# Patient Record
Sex: FEMALE | Race: BLACK OR AFRICAN AMERICAN | NOT HISPANIC OR LATINO | Employment: UNEMPLOYED | URBAN - METROPOLITAN AREA
[De-identification: names, ages, dates, MRNs, and addresses within clinical notes are randomized per-mention and may not be internally consistent; named-entity substitution may affect disease eponyms.]

---

## 2023-11-10 ENCOUNTER — HOSPITAL ENCOUNTER (EMERGENCY)
Facility: HOSPITAL | Age: 38
Discharge: HOME | End: 2023-11-11
Attending: EMERGENCY MEDICINE
Payer: COMMERCIAL

## 2023-11-10 VITALS
RESPIRATION RATE: 16 BRPM | HEART RATE: 99 BPM | SYSTOLIC BLOOD PRESSURE: 128 MMHG | TEMPERATURE: 98.4 F | HEIGHT: 63 IN | DIASTOLIC BLOOD PRESSURE: 76 MMHG | WEIGHT: 120 LBS | OXYGEN SATURATION: 98 % | BODY MASS INDEX: 21.26 KG/M2

## 2023-11-10 DIAGNOSIS — R04.0 EPISTAXIS: Primary | ICD-10-CM

## 2023-11-10 PROCEDURE — 99282 EMERGENCY DEPT VISIT SF MDM: CPT

## 2023-11-10 PROCEDURE — 99283 EMERGENCY DEPT VISIT LOW MDM: CPT | Performed by: EMERGENCY MEDICINE

## 2023-11-10 PROCEDURE — 99285 EMERGENCY DEPT VISIT HI MDM: CPT | Performed by: EMERGENCY MEDICINE

## 2023-11-10 RX ORDER — OXYMETAZOLINE HCL 0.05 %
2 SPRAY, NON-AEROSOL (ML) NASAL EVERY 12 HOURS PRN
Status: DISCONTINUED | OUTPATIENT
Start: 2023-11-10 | End: 2023-11-11 | Stop reason: HOSPADM

## 2023-11-10 ASSESSMENT — COLUMBIA-SUICIDE SEVERITY RATING SCALE - C-SSRS
1. IN THE PAST MONTH, HAVE YOU WISHED YOU WERE DEAD OR WISHED YOU COULD GO TO SLEEP AND NOT WAKE UP?: NO
6. HAVE YOU EVER DONE ANYTHING, STARTED TO DO ANYTHING, OR PREPARED TO DO ANYTHING TO END YOUR LIFE?: NO
2. HAVE YOU ACTUALLY HAD ANY THOUGHTS OF KILLING YOURSELF?: NO

## 2023-11-11 PROCEDURE — 2500000001 HC RX 250 WO HCPCS SELF ADMINISTERED DRUGS (ALT 637 FOR MEDICARE OP): Mod: SE | Performed by: STUDENT IN AN ORGANIZED HEALTH CARE EDUCATION/TRAINING PROGRAM

## 2023-11-11 RX ADMIN — OXYMETAZOLINE HYDROCHLORIDE 2 SPRAY: 0.05 SPRAY NASAL at 00:06

## 2023-11-11 NOTE — ED PROVIDER NOTES
HPI   Chief Complaint   Patient presents with    Epistaxis (Nose Bleed)       The patient is a 37 y/o F with no significant PMH who presents to the ED with epistaxis for the past two days. Per the patient, she woke up with it one morning and since then it has been on and off. She has tried packing her nose with tissue and clamping her nose but she states her nose continues to bleed. She denies any sort of trauma to her face as well as headache, lightheadedness, family bleeding disorder and blood thinner use.                          No data recorded                Patient History   Past Medical History:   Diagnosis Date    Other specified health status 2015    No pertinent past medical history     Past Surgical History:   Procedure Laterality Date     SECTION, CLASSIC  2015     Section     No family history on file.  Social History     Tobacco Use    Smoking status: Every Day     Types: Cigarettes, Cigars    Smokeless tobacco: Never   Substance Use Topics    Alcohol use: Not on file    Drug use: Not on file       Physical Exam   ED Triage Vitals [11/10/23 2312]   Temp Heart Rate Resp BP   36.9 °C (98.4 °F) 99 16 128/76      SpO2 Temp Source Heart Rate Source Patient Position   98 % Temporal Monitor Sitting      BP Location FiO2 (%)     Right arm --       Physical Exam  HENT:      Nose:      Comments: Blood present in left nares. No nasal septal hematoma or signs of septum deviation     Mouth/Throat:      Mouth: Mucous membranes are moist.      Pharynx: Oropharynx is clear.      Comments: No signs of blood in posterior pharynx  Cardiovascular:      Rate and Rhythm: Normal rate and regular rhythm.      Pulses: Normal pulses.      Heart sounds: Normal heart sounds.   Pulmonary:      Effort: Pulmonary effort is normal.      Breath sounds: Normal breath sounds.   Abdominal:      General: Abdomen is flat.      Palpations: Abdomen is soft.      Tenderness: There is no abdominal tenderness.  "  Musculoskeletal:      Cervical back: No rigidity or tenderness.   Skin:     General: Skin is warm and dry.      Capillary Refill: Capillary refill takes less than 2 seconds.   Neurological:      Mental Status: She is alert and oriented to person, place, and time.      Sensory: No sensory deficit.      Motor: No weakness.   Psychiatric:         Mood and Affect: Mood normal.         ED Course & MDM   Diagnoses as of 11/11/23 0013   Epistaxis       Medical Decision Making  The patient is a 39 y/o F with no significant pmh who presents to the ED with epistaxis for 2 days. She is afebrile, nontachycardic, nontachypneic and saturating well on room air. PE shows some blood in her left nostril. No signs of neurovascular compromise, vision loss, trauma to the face or nose. Had patient clamp nose and will provide her with Afrin. Instructed her to not use for more than two days and to use as described on the box. Patient agreeable with plan and states they will return to the ED if bleeding is uncontrollable or if they develop any new symptoms. Patient discharged in stable condition.     Amount and/or Complexity of Data Reviewed  External Data Reviewed: notes.        Procedure  Procedures     Savage Craig  11/11/23 0014    I, or a resident under my supervision, was present with the medical student who participated in the documentation of this note.  I have personally seen and examined the patient and performed the medical decision-making components. I have reviewed the medical student documentation and/or resident documentation and verified the findings in the note as written with additions or exceptions as stated in the body of the note.    Patient presented to the ED with epistaxis for two days  Has been self packing with kleenex \"and it just keeps coming back\"    Not on blood thinners  Scant dried red blood in nares  No posterior oropharynx clots    No airway concerns  No recent trauma or URI    [] irrigated and cleaned " out nares  [] instructed on use of afrin and provided bottle for patient   Return precautions reviewed and no more kleenex !  MD Agueda Manzanares MD  11/13/23 2595

## 2023-11-11 NOTE — DISCHARGE INSTRUCTIONS
You were seen for a nosebleed. You were given Afrin nasal spray. Only use this as described on the box. Please do not use for more than 2 days. Return to the ED if your symptoms worsen or you develop new symptoms.

## 2023-12-15 ENCOUNTER — HOSPITAL ENCOUNTER (EMERGENCY)
Facility: HOSPITAL | Age: 38
Discharge: HOME | End: 2023-12-15
Attending: EMERGENCY MEDICINE
Payer: COMMERCIAL

## 2023-12-15 ENCOUNTER — ANCILLARY PROCEDURE (OUTPATIENT)
Dept: EMERGENCY MEDICINE | Facility: HOSPITAL | Age: 38
End: 2023-12-15
Payer: COMMERCIAL

## 2023-12-15 VITALS
DIASTOLIC BLOOD PRESSURE: 72 MMHG | OXYGEN SATURATION: 98 % | TEMPERATURE: 98.6 F | HEART RATE: 85 BPM | BODY MASS INDEX: 21.09 KG/M2 | RESPIRATION RATE: 18 BRPM | HEIGHT: 63 IN | WEIGHT: 119 LBS | SYSTOLIC BLOOD PRESSURE: 121 MMHG

## 2023-12-15 DIAGNOSIS — I47.10 SVT (SUPRAVENTRICULAR TACHYCARDIA) (CMS-HCC): Primary | ICD-10-CM

## 2023-12-15 LAB
ALBUMIN SERPL BCP-MCNC: 4.4 G/DL (ref 3.4–5)
ALP SERPL-CCNC: 76 U/L (ref 33–110)
ALT SERPL W P-5'-P-CCNC: 62 U/L (ref 7–45)
ANION GAP BLDV CALCULATED.4IONS-SCNC: 12 MMOL/L (ref 10–25)
ANION GAP SERPL CALC-SCNC: 14 MMOL/L (ref 10–20)
AST SERPL W P-5'-P-CCNC: 127 U/L (ref 9–39)
B-HCG SERPL-ACNC: <3 MIU/ML
BASE EXCESS BLDV CALC-SCNC: 0.3 MMOL/L (ref -2–3)
BASOPHILS # BLD AUTO: 0.1 X10*3/UL (ref 0–0.1)
BASOPHILS NFR BLD AUTO: 1 %
BILIRUB SERPL-MCNC: 0.4 MG/DL (ref 0–1.2)
BODY TEMPERATURE: 37 DEGREES CELSIUS
BUN SERPL-MCNC: 18 MG/DL (ref 6–23)
CA-I BLDV-SCNC: 1.11 MMOL/L (ref 1.1–1.33)
CALCIUM SERPL-MCNC: 9.3 MG/DL (ref 8.6–10.6)
CARDIAC TROPONIN I PNL SERPL HS: 27 NG/L (ref 0–34)
CARDIAC TROPONIN I PNL SERPL HS: <3 NG/L (ref 0–34)
CHLORIDE BLDV-SCNC: 106 MMOL/L (ref 98–107)
CHLORIDE SERPL-SCNC: 107 MMOL/L (ref 98–107)
CO2 SERPL-SCNC: 25 MMOL/L (ref 21–32)
CREAT SERPL-MCNC: 0.79 MG/DL (ref 0.5–1.05)
EOSINOPHIL # BLD AUTO: 0.09 X10*3/UL (ref 0–0.7)
EOSINOPHIL NFR BLD AUTO: 0.9 %
ERYTHROCYTE [DISTWIDTH] IN BLOOD BY AUTOMATED COUNT: 12.3 % (ref 11.5–14.5)
FLUAV RNA RESP QL NAA+PROBE: NOT DETECTED
FLUBV RNA RESP QL NAA+PROBE: NOT DETECTED
GFR SERPL CREATININE-BSD FRML MDRD: >90 ML/MIN/1.73M*2
GLUCOSE BLDV-MCNC: 140 MG/DL (ref 74–99)
GLUCOSE SERPL-MCNC: 136 MG/DL (ref 74–99)
HCO3 BLDV-SCNC: 25.4 MMOL/L (ref 22–26)
HCT VFR BLD AUTO: 36.7 % (ref 36–46)
HCT VFR BLD EST: 39 % (ref 36–46)
HGB BLD-MCNC: 12.4 G/DL (ref 12–16)
HGB BLDV-MCNC: 13 G/DL (ref 12–16)
IMM GRANULOCYTES # BLD AUTO: 0.02 X10*3/UL (ref 0–0.7)
IMM GRANULOCYTES NFR BLD AUTO: 0.2 % (ref 0–0.9)
INHALED O2 CONCENTRATION: 100 %
LACTATE BLDV-SCNC: 1.6 MMOL/L (ref 0.4–2)
LYMPHOCYTES # BLD AUTO: 4.53 X10*3/UL (ref 1.2–4.8)
LYMPHOCYTES NFR BLD AUTO: 47.1 %
MAGNESIUM SERPL-MCNC: 1.85 MG/DL (ref 1.6–2.4)
MCH RBC QN AUTO: 31.3 PG (ref 26–34)
MCHC RBC AUTO-ENTMCNC: 33.8 G/DL (ref 32–36)
MCV RBC AUTO: 93 FL (ref 80–100)
MONOCYTES # BLD AUTO: 0.97 X10*3/UL (ref 0.1–1)
MONOCYTES NFR BLD AUTO: 10.1 %
NEUTROPHILS # BLD AUTO: 3.9 X10*3/UL (ref 1.2–7.7)
NEUTROPHILS NFR BLD AUTO: 40.7 %
NRBC BLD-RTO: 0 /100 WBCS (ref 0–0)
OXYHGB MFR BLDV: 57 % (ref 45–75)
PCO2 BLDV: 42 MM HG (ref 41–51)
PH BLDV: 7.39 PH (ref 7.33–7.43)
PLATELET # BLD AUTO: 484 X10*3/UL (ref 150–450)
PO2 BLDV: 37 MM HG (ref 35–45)
POTASSIUM BLDV-SCNC: 3.7 MMOL/L (ref 3.5–5.3)
POTASSIUM SERPL-SCNC: 4 MMOL/L (ref 3.5–5.3)
PROT SERPL-MCNC: 7.3 G/DL (ref 6.4–8.2)
RBC # BLD AUTO: 3.96 X10*6/UL (ref 4–5.2)
SAO2 % BLDV: 59 % (ref 45–75)
SARS-COV-2 RNA RESP QL NAA+PROBE: NOT DETECTED
SODIUM BLDV-SCNC: 140 MMOL/L (ref 136–145)
SODIUM SERPL-SCNC: 142 MMOL/L (ref 136–145)
WBC # BLD AUTO: 9.6 X10*3/UL (ref 4.4–11.3)

## 2023-12-15 PROCEDURE — 83735 ASSAY OF MAGNESIUM: CPT | Performed by: EMERGENCY MEDICINE

## 2023-12-15 PROCEDURE — 85025 COMPLETE CBC W/AUTO DIFF WBC: CPT | Performed by: EMERGENCY MEDICINE

## 2023-12-15 PROCEDURE — 84484 ASSAY OF TROPONIN QUANT: CPT | Performed by: EMERGENCY MEDICINE

## 2023-12-15 PROCEDURE — 96374 THER/PROPH/DIAG INJ IV PUSH: CPT

## 2023-12-15 PROCEDURE — 36415 COLL VENOUS BLD VENIPUNCTURE: CPT | Performed by: EMERGENCY MEDICINE

## 2023-12-15 PROCEDURE — 83605 ASSAY OF LACTIC ACID: CPT | Performed by: EMERGENCY MEDICINE

## 2023-12-15 PROCEDURE — 93005 ELECTROCARDIOGRAM TRACING: CPT

## 2023-12-15 PROCEDURE — 2500000005 HC RX 250 GENERAL PHARMACY W/O HCPCS: Mod: SE

## 2023-12-15 PROCEDURE — 84702 CHORIONIC GONADOTROPIN TEST: CPT | Performed by: STUDENT IN AN ORGANIZED HEALTH CARE EDUCATION/TRAINING PROGRAM

## 2023-12-15 PROCEDURE — 80053 COMPREHEN METABOLIC PANEL: CPT | Performed by: EMERGENCY MEDICINE

## 2023-12-15 PROCEDURE — 93010 ELECTROCARDIOGRAM REPORT: CPT | Performed by: EMERGENCY MEDICINE

## 2023-12-15 PROCEDURE — 96361 HYDRATE IV INFUSION ADD-ON: CPT

## 2023-12-15 PROCEDURE — 87636 SARSCOV2 & INF A&B AMP PRB: CPT | Performed by: STUDENT IN AN ORGANIZED HEALTH CARE EDUCATION/TRAINING PROGRAM

## 2023-12-15 PROCEDURE — 99285 EMERGENCY DEPT VISIT HI MDM: CPT | Performed by: EMERGENCY MEDICINE

## 2023-12-15 PROCEDURE — 99284 EMERGENCY DEPT VISIT MOD MDM: CPT | Mod: 25 | Performed by: EMERGENCY MEDICINE

## 2023-12-15 PROCEDURE — 36415 COLL VENOUS BLD VENIPUNCTURE: CPT | Performed by: STUDENT IN AN ORGANIZED HEALTH CARE EDUCATION/TRAINING PROGRAM

## 2023-12-15 PROCEDURE — 2500000004 HC RX 250 GENERAL PHARMACY W/ HCPCS (ALT 636 FOR OP/ED): Mod: SE | Performed by: STUDENT IN AN ORGANIZED HEALTH CARE EDUCATION/TRAINING PROGRAM

## 2023-12-15 PROCEDURE — 82435 ASSAY OF BLOOD CHLORIDE: CPT | Performed by: EMERGENCY MEDICINE

## 2023-12-15 RX ORDER — METOPROLOL TARTRATE 1 MG/ML
INJECTION, SOLUTION INTRAVENOUS
Status: COMPLETED
Start: 2023-12-15 | End: 2023-12-15

## 2023-12-15 RX ORDER — METOPROLOL TARTRATE 1 MG/ML
5 INJECTION, SOLUTION INTRAVENOUS ONCE
Status: COMPLETED | OUTPATIENT
Start: 2023-12-15 | End: 2023-12-15

## 2023-12-15 RX ADMIN — METOPROLOL TARTRATE 5 MG: 1 INJECTION, SOLUTION INTRAVENOUS at 08:25

## 2023-12-15 RX ADMIN — SODIUM CHLORIDE, POTASSIUM CHLORIDE, SODIUM LACTATE AND CALCIUM CHLORIDE 1000 ML: 600; 310; 30; 20 INJECTION, SOLUTION INTRAVENOUS at 08:26

## 2023-12-15 ASSESSMENT — COLUMBIA-SUICIDE SEVERITY RATING SCALE - C-SSRS
2. HAVE YOU ACTUALLY HAD ANY THOUGHTS OF KILLING YOURSELF?: NO
1. IN THE PAST MONTH, HAVE YOU WISHED YOU WERE DEAD OR WISHED YOU COULD GO TO SLEEP AND NOT WAKE UP?: NO
6. HAVE YOU EVER DONE ANYTHING, STARTED TO DO ANYTHING, OR PREPARED TO DO ANYTHING TO END YOUR LIFE?: NO

## 2023-12-15 ASSESSMENT — PAIN SCALES - GENERAL: PAINLEVEL_OUTOF10: 0 - NO PAIN

## 2023-12-15 ASSESSMENT — LIFESTYLE VARIABLES
HAVE YOU EVER FELT YOU SHOULD CUT DOWN ON YOUR DRINKING: NO
EVER HAD A DRINK FIRST THING IN THE MORNING TO STEADY YOUR NERVES TO GET RID OF A HANGOVER: NO
REASON UNABLE TO ASSESS: YES
EVER FELT BAD OR GUILTY ABOUT YOUR DRINKING: NO
HAVE PEOPLE ANNOYED YOU BY CRITICIZING YOUR DRINKING: NO

## 2023-12-15 ASSESSMENT — PAIN - FUNCTIONAL ASSESSMENT: PAIN_FUNCTIONAL_ASSESSMENT: 0-10

## 2023-12-15 NOTE — ED PROVIDER NOTES
CC: Chest Pain and Shortness of Breath     HPI:  Irlanda Cm is a 38 y.o. female  With a past medical history of SVT presenting to the emergency department today due to palpitations in her chest.  Patient states she was in her normal state of health when she woke up this morning.  She states that she was getting her kids ready for school when she started noticing palpitations.  Last time she had SVT, it felt similar to this.  She states that last time they gave her few doses of medications to her IV and her heart went into normal rhythm.  She denies any chest pain or shortness of breath, just feels the fluttering in her chest.  She denies any recent sick symptoms or any other concerns at this time.    Limitations to History: none  Additional History provided by: N/A    External Records Reviewed:  Recent available ED and inpatient notes reviewed in EMR.    PMHx/PSHx:  Per HPI.   - has a past medical history of Other specified health status (2015).  - has a past surgical history that includes  section, classic (2015).    Medications:  Reviewed in EMR. See EMR for complete list of medications and doses.    Allergies:  Penicillins    Social History:  - Tobacco:  reports that she has been smoking cigarettes and cigars. She has never used smokeless tobacco.   - Alcohol:  reports that she does not currently use alcohol.   - Illicit Drugs:  has no history on file for drug use.     ROS:  Per HPI.     ???????????????????????????????????????????????????????????????  Triage Vitals:  T 36 °C (96.8 °F)  HR (!) 207  /86  RR 18  O2 100 % None (Room air)    Physical Exam  Vitals and nursing note reviewed.   Constitutional:       General: She is not in acute distress.     Appearance: She is well-developed.   HENT:      Head: Normocephalic and atraumatic.   Eyes:      Conjunctiva/sclera: Conjunctivae normal.   Cardiovascular:      Rate and Rhythm: Regular rhythm. Tachycardia present.      Heart  sounds: No murmur heard.  Pulmonary:      Effort: Pulmonary effort is normal. No respiratory distress.      Breath sounds: Normal breath sounds.   Abdominal:      Palpations: Abdomen is soft.      Tenderness: There is no abdominal tenderness.   Musculoskeletal:         General: No swelling.      Cervical back: Neck supple.   Skin:     General: Skin is warm and dry.      Capillary Refill: Capillary refill takes less than 2 seconds.   Neurological:      Mental Status: She is alert.   Psychiatric:         Mood and Affect: Mood normal.       ???????????????????????????????????????????????????????????????  ED Course:  Diagnoses as of 12/16/23 2035   SVT (supraventricular tachycardia)   EKG shows a SVT at a rate of 207 otherwise normal intervals and normal ST and T wave pattern with no evidence of acute ischemia or other acute findings    Repeat EKG sinus tachycardia at a rate of 123.  Third EKG showing normal sinus rhythm at a rate of 80    EKG & Images:  Independently reviewed, See ED Course      MDM:  -The patient is a 38-year-old woman with a past medical history of SVT presenting to the emergency department today due to palpitations.  Patient is presenting in SVT per her EKG.  Her blood pressure is normal, and she is mentating normally.  As such, determined it was safe to proceed with vagal maneuvers.  Modified vagal maneuver where the patient was asked to blow against a syringe and then she was promptly laid supine with her legs elevated.  On initial attempt, this did begin to slow her heart rate down however she returned back to SVT.  This was trialed again for the second time and the seem to convert her into a sinus tachycardia.  Repeat EKG was done which confirms a sinus tachycardia.  As such, she was given a dose of metoprolol.  Lab work chest x-ray and pregnancy test were all ordered to rule out other causes for sinus tachycardia such as infection, abnormal electrolytes, pregnancy.  All lab results were  negative.  On reassessment, patient is in normal sinus rhythm at a rate in the 80s.  She feels as though her symptoms have completely resolved.  She remained in this rhythm for several hours, as such, she is stable for discharge home with strict return precautions and instructions to follow-up with cardiology and your PCP.  She is understanding of this and amenable to discharge at this time.    Final diagnoses:   [I47.10] SVT (supraventricular tachycardia)         Social Determinants Limiting Care:  None identified    Disposition:  Discharge    Adali Hood MD   Emergency Medicine Resident, PGY3  [facility]     Disclaimer: This note was dictated by speech recognition. Minor errors in transcription may be present    Procedures ? [employer] last updated 12/16/2023 8:35 PM        Adali Hood MD  Resident  12/16/23 2035

## 2023-12-15 NOTE — ED TRIAGE NOTES
"Pt c/o chest \"flutter\"and SOB x1 hour. Pt states seen here before for same thing twice and everything was negative. -N/V/D.  "

## 2023-12-15 NOTE — DISCHARGE INSTRUCTIONS
Your symptoms were due to SVT which is a rhythm that makes her heart go very fast.  We were able to get you out of this rhythm by doing a vagal maneuver.  If this happens again you are welcome to try this vagal maneuver again you can do this by either bearing down for several minutes as hard as you can, or you could do like we did and try to blow really hard on a syringe and then lay flat on your back with your leg straight up in the air.  If you do not feel comfortable doing this, you are always welcome to come back to the emergency room and we are happy to help you get up of SVT.  If anything else changes, please come back to the emergency room.  Please follow-up with your cardiologist and your PCP as well.

## 2023-12-16 LAB
ATRIAL RATE: 123 BPM
ATRIAL RATE: 80 BPM
P AXIS: 58 DEGREES
P AXIS: 69 DEGREES
P OFFSET: 201 MS
P OFFSET: 204 MS
P ONSET: 148 MS
P ONSET: 152 MS
PR INTERVAL: 138 MS
PR INTERVAL: 150 MS
Q ONSET: 221 MS
Q ONSET: 223 MS
QRS COUNT: 14 BEATS
QRS COUNT: 20 BEATS
QRS DURATION: 72 MS
QRS DURATION: 74 MS
QT INTERVAL: 304 MS
QT INTERVAL: 368 MS
QTC CALCULATION(BAZETT): 424 MS
QTC CALCULATION(BAZETT): 435 MS
QTC FREDERICIA: 386 MS
QTC FREDERICIA: 405 MS
R AXIS: 49 DEGREES
R AXIS: 57 DEGREES
T AXIS: 4 DEGREES
T AXIS: 40 DEGREES
T OFFSET: 373 MS
T OFFSET: 407 MS
VENTRICULAR RATE: 123 BPM
VENTRICULAR RATE: 80 BPM

## 2024-03-15 ENCOUNTER — APPOINTMENT (OUTPATIENT)
Dept: RADIOLOGY | Facility: HOSPITAL | Age: 39
End: 2024-03-15
Payer: COMMERCIAL

## 2024-03-15 ENCOUNTER — HOSPITAL ENCOUNTER (EMERGENCY)
Facility: HOSPITAL | Age: 39
Discharge: HOME | End: 2024-03-16
Attending: EMERGENCY MEDICINE
Payer: COMMERCIAL

## 2024-03-15 DIAGNOSIS — S90.30XA CONTUSION OF DORSUM OF FOOT: Primary | ICD-10-CM

## 2024-03-15 DIAGNOSIS — S93.601A FOOT SPRAIN, RIGHT, INITIAL ENCOUNTER: ICD-10-CM

## 2024-03-15 LAB — PREGNANCY TEST URINE, POC: NEGATIVE

## 2024-03-15 PROCEDURE — 73610 X-RAY EXAM OF ANKLE: CPT | Mod: RT

## 2024-03-15 PROCEDURE — 81025 URINE PREGNANCY TEST: CPT

## 2024-03-15 PROCEDURE — 99284 EMERGENCY DEPT VISIT MOD MDM: CPT

## 2024-03-15 PROCEDURE — 73630 X-RAY EXAM OF FOOT: CPT | Mod: RT

## 2024-03-15 PROCEDURE — 99284 EMERGENCY DEPT VISIT MOD MDM: CPT | Performed by: EMERGENCY MEDICINE

## 2024-03-15 PROCEDURE — 73610 X-RAY EXAM OF ANKLE: CPT | Mod: RIGHT SIDE | Performed by: RADIOLOGY

## 2024-03-15 PROCEDURE — 73630 X-RAY EXAM OF FOOT: CPT | Mod: RIGHT SIDE | Performed by: RADIOLOGY

## 2024-03-15 PROCEDURE — 2500000001 HC RX 250 WO HCPCS SELF ADMINISTERED DRUGS (ALT 637 FOR MEDICARE OP): Mod: SE

## 2024-03-15 RX ORDER — IBUPROFEN 600 MG/1
600 TABLET ORAL ONCE
Status: COMPLETED | OUTPATIENT
Start: 2024-03-15 | End: 2024-03-15

## 2024-03-15 RX ORDER — IBUPROFEN 600 MG/1
TABLET ORAL
Status: COMPLETED
Start: 2024-03-15 | End: 2024-03-15

## 2024-03-15 RX ORDER — ACETAMINOPHEN 325 MG/1
975 TABLET ORAL ONCE
Status: COMPLETED | OUTPATIENT
Start: 2024-03-15 | End: 2024-03-15

## 2024-03-15 RX ADMIN — IBUPROFEN 600 MG: 600 TABLET ORAL at 23:09

## 2024-03-15 RX ADMIN — IBUPROFEN 600 MG: 600 TABLET, FILM COATED ORAL at 23:09

## 2024-03-15 RX ADMIN — ACETAMINOPHEN 975 MG: 325 TABLET ORAL at 23:10

## 2024-03-15 ASSESSMENT — COLUMBIA-SUICIDE SEVERITY RATING SCALE - C-SSRS
1. IN THE PAST MONTH, HAVE YOU WISHED YOU WERE DEAD OR WISHED YOU COULD GO TO SLEEP AND NOT WAKE UP?: NO
2. HAVE YOU ACTUALLY HAD ANY THOUGHTS OF KILLING YOURSELF?: NO
6. HAVE YOU EVER DONE ANYTHING, STARTED TO DO ANYTHING, OR PREPARED TO DO ANYTHING TO END YOUR LIFE?: NO

## 2024-03-16 VITALS
RESPIRATION RATE: 18 BRPM | TEMPERATURE: 60.8 F | HEART RATE: 82 BPM | HEIGHT: 62 IN | DIASTOLIC BLOOD PRESSURE: 78 MMHG | WEIGHT: 120 LBS | BODY MASS INDEX: 22.08 KG/M2 | OXYGEN SATURATION: 96 % | SYSTOLIC BLOOD PRESSURE: 144 MMHG

## 2024-03-16 RX ORDER — IBUPROFEN 600 MG/1
600 TABLET ORAL EVERY 6 HOURS PRN
Qty: 28 TABLET | Refills: 0 | Status: SHIPPED | OUTPATIENT
Start: 2024-03-16 | End: 2024-03-23

## 2024-03-16 RX ORDER — ACETAMINOPHEN 325 MG/1
650 TABLET ORAL EVERY 6 HOURS PRN
Qty: 56 TABLET | Refills: 0 | Status: SHIPPED | OUTPATIENT
Start: 2024-03-16 | End: 2024-03-23

## 2024-03-16 NOTE — ED TRIAGE NOTES
ERIC COUCH is a 38y old F with no pertinent PMHx is presenting to [facility] ED with a chief complaint of R foot pain s/p kicking a box filled with baby wipes. There is no swelling or deformity noted upon physical exam. Pt has strong pedal pulse to affected extremity. Pt does verbalizes decrease weight bearing to R foot. Allergy to PCN

## 2024-03-16 NOTE — DISCHARGE INSTRUCTIONS
You are seen in the emergency department for injury to your right foot.  Your x-rays were negative for any fractures.  Please use compression ice heat elevation for symptom control and weight-bear as tolerated.  Please follow-up with your primary care doctor regarding your visit today.  Please use Tylenol and ibuprofen as needed for pain control.

## 2024-09-26 ENCOUNTER — HOSPITAL ENCOUNTER (EMERGENCY)
Facility: HOSPITAL | Age: 39
Discharge: HOME | End: 2024-09-26
Payer: COMMERCIAL

## 2024-09-26 ENCOUNTER — PHARMACY VISIT (OUTPATIENT)
Dept: PHARMACY | Facility: CLINIC | Age: 39
End: 2024-09-26
Payer: MEDICAID

## 2024-09-26 VITALS
BODY MASS INDEX: 20.24 KG/M2 | TEMPERATURE: 96.8 F | SYSTOLIC BLOOD PRESSURE: 188 MMHG | DIASTOLIC BLOOD PRESSURE: 95 MMHG | WEIGHT: 110 LBS | RESPIRATION RATE: 16 BRPM | OXYGEN SATURATION: 99 % | HEART RATE: 92 BPM | HEIGHT: 62 IN

## 2024-09-26 DIAGNOSIS — K08.89 PAIN, DENTAL: Primary | ICD-10-CM

## 2024-09-26 PROBLEM — R00.2 PALPITATIONS: Status: ACTIVE | Noted: 2024-09-26

## 2024-09-26 PROBLEM — R79.89 LOW TSH LEVEL: Status: ACTIVE | Noted: 2024-09-26

## 2024-09-26 PROBLEM — I47.10 SVT (SUPRAVENTRICULAR TACHYCARDIA) (CMS-HCC): Status: ACTIVE | Noted: 2024-09-26

## 2024-09-26 PROBLEM — R21 RASH: Status: ACTIVE | Noted: 2024-09-26

## 2024-09-26 PROCEDURE — 99283 EMERGENCY DEPT VISIT LOW MDM: CPT

## 2024-09-26 PROCEDURE — RXMED WILLOW AMBULATORY MEDICATION CHARGE

## 2024-09-26 PROCEDURE — 99284 EMERGENCY DEPT VISIT MOD MDM: CPT | Performed by: NURSE PRACTITIONER

## 2024-09-26 RX ORDER — CLINDAMYCIN HYDROCHLORIDE 150 MG/1
450 CAPSULE ORAL 3 TIMES DAILY
Qty: 63 CAPSULE | Refills: 0 | Status: SHIPPED | OUTPATIENT
Start: 2024-09-26 | End: 2024-10-03

## 2024-09-26 RX ORDER — NAPROXEN 500 MG/1
500 TABLET ORAL 2 TIMES DAILY PRN
Qty: 20 TABLET | Refills: 0 | Status: SHIPPED | OUTPATIENT
Start: 2024-09-26

## 2024-09-26 ASSESSMENT — PAIN DESCRIPTION - PAIN TYPE: TYPE: ACUTE PAIN

## 2024-09-26 ASSESSMENT — PAIN DESCRIPTION - PROGRESSION: CLINICAL_PROGRESSION: NOT CHANGED

## 2024-09-26 ASSESSMENT — PAIN DESCRIPTION - DESCRIPTORS: DESCRIPTORS: ACHING

## 2024-09-26 ASSESSMENT — COLUMBIA-SUICIDE SEVERITY RATING SCALE - C-SSRS
2. HAVE YOU ACTUALLY HAD ANY THOUGHTS OF KILLING YOURSELF?: NO
6. HAVE YOU EVER DONE ANYTHING, STARTED TO DO ANYTHING, OR PREPARED TO DO ANYTHING TO END YOUR LIFE?: NO
1. IN THE PAST MONTH, HAVE YOU WISHED YOU WERE DEAD OR WISHED YOU COULD GO TO SLEEP AND NOT WAKE UP?: NO

## 2024-09-26 ASSESSMENT — PAIN DESCRIPTION - ORIENTATION: ORIENTATION: RIGHT;LOWER

## 2024-09-26 ASSESSMENT — PAIN DESCRIPTION - LOCATION: LOCATION: TEETH

## 2024-09-26 ASSESSMENT — PAIN DESCRIPTION - ONSET: ONSET: ONGOING

## 2024-09-26 ASSESSMENT — PAIN SCALES - GENERAL: PAINLEVEL_OUTOF10: 10 - WORST POSSIBLE PAIN

## 2024-09-26 ASSESSMENT — PAIN DESCRIPTION - FREQUENCY: FREQUENCY: CONSTANT/CONTINUOUS

## 2024-09-26 ASSESSMENT — PAIN - FUNCTIONAL ASSESSMENT: PAIN_FUNCTIONAL_ASSESSMENT: 0-10

## 2024-09-26 NOTE — ED PROVIDER NOTES
HPI   Chief Complaint   Patient presents with    Jaw Pain     Right bottom    Dental Pain       Patient is a healthy nontoxic-appearing 38-year-old female with past medical history palpitations, rash, SVT, low TSH level, presents the emergency room today for complaint of right lower jaw pain.  Patient states symptoms been ongoing over the past 2 weeks that she noticed pain and some swelling of the right lower jaw over the past several days.  Patient states using Tylenol at home for pain however has not been helping.  Patient states relief may be experiencing dental abscess due to discomfort.  Patient denies any injuries trauma or falls, headache pain, visual disturbances, numbness or tingling.  Patient denies any difficulty controlling secretions or sensation of airway closing.  Patient denies any recent dental work or surgeries.  Patient denies any chest pain, abdominal pain, nausea vomit, diarrhea or constipation, fever, shaking, or chills.              Patient History   Past Medical History:   Diagnosis Date    Other specified health status 2015    No pertinent past medical history     Past Surgical History:   Procedure Laterality Date     SECTION, CLASSIC  2015     Section     No family history on file.  Social History     Tobacco Use    Smoking status: Every Day     Types: Cigarettes, Cigars    Smokeless tobacco: Never   Substance Use Topics    Alcohol use: Not Currently    Drug use: Not on file       Physical Exam   ED Triage Vitals [24 1539]   Temperature Heart Rate Respirations BP   36 °C (96.8 °F) 92 16 (!) 188/95      Pulse Ox Temp Source Heart Rate Source Patient Position   99 % Oral Monitor Sitting      BP Location FiO2 (%)     Left arm --       Physical Exam  Vitals and nursing note reviewed.   Constitutional:       General: She is not in acute distress.     Appearance: Normal appearance. She is well-developed and normal weight. She is not ill-appearing, toxic-appearing  or diaphoretic.   HENT:      Head: Normocephalic and atraumatic.      Nose: No congestion or rhinorrhea.      Mouth/Throat:      Mouth: Mucous membranes are moist.      Pharynx: No oropharyngeal exudate or posterior oropharyngeal erythema.   Eyes:      General: No scleral icterus.        Right eye: No discharge.         Left eye: No discharge.      Extraocular Movements: Extraocular movements intact.      Conjunctiva/sclera: Conjunctivae normal.      Pupils: Pupils are equal, round, and reactive to light.   Neck:      Vascular: No carotid bruit.   Cardiovascular:      Rate and Rhythm: Normal rate and regular rhythm.      Pulses: Normal pulses.      Heart sounds: Normal heart sounds. No murmur heard.     No friction rub. No gallop.   Pulmonary:      Effort: Pulmonary effort is normal. No respiratory distress.      Breath sounds: Normal breath sounds. No stridor. No wheezing, rhonchi or rales.   Chest:      Chest wall: No tenderness.   Musculoskeletal:         General: No swelling. Normal range of motion.      Cervical back: Normal range of motion and neck supple. No rigidity or tenderness.   Lymphadenopathy:      Cervical: No cervical adenopathy.   Skin:     General: Skin is warm and dry.      Capillary Refill: Capillary refill takes less than 2 seconds.   Neurological:      Mental Status: She is alert.           ED Course & MDM   Diagnoses as of 09/26/24 1803   Pain, dental                 No data recorded     [place] Coma Scale Score: 15 (09/26/24 1541 : Eleno Bates RN)                           Medical Decision Making  Given patient's complaint presentation a thorough exam was performed.  On exam patient does have several cracked molars of the right lower jaw with no gumline fluctuance, erosion or bleeding present, managing secretions appropriately, no stridor or wheezing auscultated over the neck, no adventitious lung sounds auscultated, speaking complete sentences no respiratory distress, cardiac sounds  auscultated are regular, remains hemodynamically stable during emergency evaluation, is afebrile, I have a low suspicion for airway compromise, Yoav's angina.  Given findings I suspect patient is likely experiencing dental abscess at this time.  Patient received prescription for clindamycin due to penicillin allergy.  Patient was received prescription for naproxen.  I encouraged monitoring symptoms, if they become worse return to the emergency room for further evaluation, otherwise follow-up with dentistry in the next several days.  Patient was agreeable this plan and discharged home in stable condition.    SUZAN Covarrubias     Portions of this note were generated using digital voice recognition software, and may contain grammatical errors      Procedure  Procedures     SUZAN Covarrubias  09/26/24 0311

## 2024-09-26 NOTE — DISCHARGE INSTRUCTIONS
Here is a list of free dental clinics in [place], [place]  1. [facility]  [address] [place] [facility] [address]  [phone]    See Clinic Full Details  2. [facility] [place]  [facility] [place]  [address] [place] - [address]  [phone]    See Clinic Full Details    3. [facility]  [address] [facility] [place] [facility] [address]  [phone]    See Clinic Full Details  4. [facility] [place]  [facility] [place]  [address]  [place], [place] - [address]  [phone]    See Clinic Full Details  5. [facility]  [address] [facility] [place] [facility] [address]  [phone]    See Clinic Full Details    6. [facility]  [address], [place] - [address]  [phone]    See Clinic Full Details  7. [facility]  [address] [place] - [address]  [phone]    See Clinic Full Details  8. [facility]  [address] [facility], [place] - [address]  [phone]    See Clinic Full Details  9. [facility]  [facility]  [address] [place], [place] - [address]  [phone]    See Clinic Full Details  10. [facility]  [address] [place], [place] - [address]  [phone]    See Clinic Full Details  11. [facility]  [facility]  [address] [facility], [place] - [address]  [phone]    See Clinic Full Details  12. [facility]  [facility]  [address]  [place] - [address]  [phone]

## 2024-09-26 NOTE — ED TRIAGE NOTES
Patient presents with complaint of bottom right toothache with intermittent jaw pain for two weeks. No trauma or injury to face. Also presents with facial swelling

## 2024-10-09 NOTE — Clinical Note
Irlanda Cm was seen and treated in our emergency department on 3/15/2024.  She may return to work on 03/19/2024.       If you have any questions or concerns, please don't hesitate to call.      Daysi Calderon, DO weight-bearing as tolerated